# Patient Record
(demographics unavailable — no encounter records)

---

## 2025-02-10 NOTE — LETTER BODY
[FreeTextEntry1] : Brandi Tobar [address] [place] [address] [phone]  Dear Dr. Tobar,  Reason for visit: Hematuria  This is a 63 year old woman with hematuria referred for evaluation. Patient is entirely asymptomatic. She denies any gross hematuria, dysuria or urinary incontinence. The patient does not smoke. Patient reports no pain. The patient denies any aggravating or relieving factors. The patient denies any interference of function. The patient is entirely asymptomatic. All other review of systems are negative. Past medical history, family history, and social history were inquired and were noncontributory. Medications and allergies were reviewed.  On examination, the patient is a healthy-appearing woman in no acute distress. She is alert and oriented follows commands. She  has normal mood and affect. She is normocephalic. Neck is supple. Oral no thrush. Respirations are unlabored. Abdomen is soft and nontender. Bladder is nonpalpable. No CVA tenderness. Neurologically she is grossly intact. No peripheral edema. Skin without gross abnormality.  Urinalysis demonstrated 3-5 red cells per high-power field  Assessment: Microscopic hematuria.  I counseled the patient on the various etiologies of the microscopic hematuria. I discussed the risk of occult malignancy. I counseled the patient about microscopic hematuria. I recommended patient obtain urine cytology, cystoscopy, and upper tract imaging.  I counseled the patient about the procedures. I answered the patient's questions. The risks and expected outcomes were discussed. The patient will follow up as directed and contact me with any questions or concerns. Thank you for the opportunity to participate in the care of this patient. I'll keep you updated on her progress.  Plan: Cystoscopy, urine cytology, upper tract imaging.